# Patient Record
Sex: MALE | Race: WHITE | NOT HISPANIC OR LATINO | URBAN - METROPOLITAN AREA
[De-identification: names, ages, dates, MRNs, and addresses within clinical notes are randomized per-mention and may not be internally consistent; named-entity substitution may affect disease eponyms.]

---

## 2017-02-05 ENCOUNTER — OFFICE VISIT - HEALTHEAST (OUTPATIENT)
Dept: FAMILY MEDICINE | Facility: CLINIC | Age: 27
End: 2017-02-05

## 2017-02-05 DIAGNOSIS — J32.9 SINUSITIS: ICD-10-CM

## 2017-02-05 DIAGNOSIS — H61.20 CERUMEN IMPACTION: ICD-10-CM

## 2017-02-13 ENCOUNTER — COMMUNICATION - HEALTHEAST (OUTPATIENT)
Dept: FAMILY MEDICINE | Facility: CLINIC | Age: 27
End: 2017-02-13

## 2017-02-14 ENCOUNTER — AMBULATORY - HEALTHEAST (OUTPATIENT)
Dept: FAMILY MEDICINE | Facility: CLINIC | Age: 27
End: 2017-02-14

## 2017-02-14 DIAGNOSIS — J32.9 SINUSITIS: ICD-10-CM

## 2017-05-09 ENCOUNTER — OFFICE VISIT - HEALTHEAST (OUTPATIENT)
Dept: FAMILY MEDICINE | Facility: CLINIC | Age: 27
End: 2017-05-09

## 2017-05-09 DIAGNOSIS — J32.9 SINUS INFECTION: ICD-10-CM

## 2017-05-12 ENCOUNTER — OFFICE VISIT - HEALTHEAST (OUTPATIENT)
Dept: FAMILY MEDICINE | Facility: CLINIC | Age: 27
End: 2017-05-12

## 2017-05-12 ENCOUNTER — COMMUNICATION - HEALTHEAST (OUTPATIENT)
Dept: SCHEDULING | Facility: CLINIC | Age: 27
End: 2017-05-12

## 2017-05-12 DIAGNOSIS — J32.9 RECURRENT SINUS INFECTIONS: ICD-10-CM

## 2017-05-14 ENCOUNTER — COMMUNICATION - HEALTHEAST (OUTPATIENT)
Dept: SCHEDULING | Facility: CLINIC | Age: 27
End: 2017-05-14

## 2017-05-15 ENCOUNTER — OFFICE VISIT - HEALTHEAST (OUTPATIENT)
Dept: FAMILY MEDICINE | Facility: CLINIC | Age: 27
End: 2017-05-15

## 2017-05-15 DIAGNOSIS — J30.9 ALLERGIC RHINITIS: ICD-10-CM

## 2017-05-26 ENCOUNTER — OFFICE VISIT - HEALTHEAST (OUTPATIENT)
Dept: ALLERGY | Facility: CLINIC | Age: 27
End: 2017-05-26

## 2017-05-26 DIAGNOSIS — J30.89 ALLERGIC RHINITIS DUE TO OTHER ALLERGEN: ICD-10-CM

## 2017-05-26 ASSESSMENT — MIFFLIN-ST. JEOR: SCORE: 1958.24

## 2017-08-08 ENCOUNTER — COMMUNICATION - HEALTHEAST (OUTPATIENT)
Dept: FAMILY MEDICINE | Facility: CLINIC | Age: 27
End: 2017-08-08

## 2017-08-08 DIAGNOSIS — J30.89 ALLERGIC RHINITIS DUE TO OTHER ALLERGEN: ICD-10-CM

## 2017-10-10 ENCOUNTER — OFFICE VISIT - HEALTHEAST (OUTPATIENT)
Dept: FAMILY MEDICINE | Facility: CLINIC | Age: 27
End: 2017-10-10

## 2017-10-10 DIAGNOSIS — L72.3 SEBACEOUS CYST: ICD-10-CM

## 2018-06-06 ENCOUNTER — COMMUNICATION - HEALTHEAST (OUTPATIENT)
Dept: FAMILY MEDICINE | Facility: CLINIC | Age: 28
End: 2018-06-06

## 2018-06-06 ENCOUNTER — COMMUNICATION - HEALTHEAST (OUTPATIENT)
Dept: SCHEDULING | Facility: CLINIC | Age: 28
End: 2018-06-06

## 2018-06-06 ENCOUNTER — RECORDS - HEALTHEAST (OUTPATIENT)
Dept: GENERAL RADIOLOGY | Facility: CLINIC | Age: 28
End: 2018-06-06

## 2018-06-06 ENCOUNTER — OFFICE VISIT - HEALTHEAST (OUTPATIENT)
Dept: FAMILY MEDICINE | Facility: CLINIC | Age: 28
End: 2018-06-06

## 2018-06-06 DIAGNOSIS — R52 PAIN: ICD-10-CM

## 2018-06-06 DIAGNOSIS — R52 PAIN, UNSPECIFIED: ICD-10-CM

## 2018-06-06 DIAGNOSIS — J30.9 ALLERGIC RHINITIS: ICD-10-CM

## 2018-06-06 RX ORDER — IBUPROFEN 200 MG
200 TABLET ORAL
Status: SHIPPED | COMMUNITY
Start: 2012-08-02

## 2019-09-30 ENCOUNTER — RECORDS - HEALTHEAST (OUTPATIENT)
Dept: ADMINISTRATIVE | Facility: OTHER | Age: 29
End: 2019-09-30

## 2019-10-09 ENCOUNTER — RECORDS - HEALTHEAST (OUTPATIENT)
Dept: ADMINISTRATIVE | Facility: OTHER | Age: 29
End: 2019-10-09

## 2021-05-30 VITALS — HEIGHT: 70 IN | WEIGHT: 222 LBS | BODY MASS INDEX: 31.78 KG/M2

## 2021-05-30 VITALS — WEIGHT: 224.9 LBS

## 2021-05-30 VITALS — WEIGHT: 224 LBS

## 2021-05-30 VITALS — WEIGHT: 221 LBS

## 2021-05-30 VITALS — WEIGHT: 223 LBS

## 2021-05-31 VITALS — BODY MASS INDEX: 31.57 KG/M2 | WEIGHT: 220 LBS

## 2021-06-01 VITALS — WEIGHT: 209 LBS | BODY MASS INDEX: 29.99 KG/M2

## 2021-06-10 NOTE — PROGRESS NOTES
ASSESSMENT:  1. Allergic rhinitis  Presumed, patient has year-round symptoms, has never had allergy testing.  - Ambulatory referral to Allergy      PLAN:  1.  The patient will continue his symptomatic treatment he is just finishing a course of Levaquin.  2.  Add prednisone 20 mg twice daily for 5 days.  3.  Referral to her allergist, explained to the patient that I would prefer to have him tested for allergies and if so have optimal treatment before I would consider ENT referral.  4.  Follow-up as needed for this and other issues.    Orders Placed This Encounter   Procedures     Ambulatory referral to Allergy     Referral Priority:   Routine     Referral Type:   Consultation     Referral Reason:   Evaluation and Treatment     Requested Specialty:   Allergy     Number of Visits Requested:   1     There are no discontinued medications.    No Follow-up on file.    CHIEF COMPLAINT:  Chief Complaint   Patient presents with     Sinusitis     recurrent illness - was seen at [facility] last week has gotten worse, pressure under eyes and forehead - given rx  and not any better      Allergies     has allergies as well, using nasal spray which seems to help a little - talk about ENT referral       SUBJECTIVE:  Stevan is a 27 y.o. male who presents to the clinic today regarding congestion.    Congestion: He has had long-standing sinus issues, which have worsened over the past several years. He has worse sinus symptoms in the spring or in the winter when others are sick with a cold, which he seems to be susceptible to catching as well. His cold will last for weeks. Over the past 3 weeks, he has been experiencing nasal symptoms. He has been seen twice over the past week with congestion, and given Levaquin. He started taking Levaquin on 5/9/2017 and takes his last pill today. He has found Afrin to be helpful. He has had some coughing with drainage.    Typically, he uses a Sudafed with congestion. He uses a Neti Pot and Flonase daily.  During allergy season, he uses Allegra. He has found the Neti Pot to be helpful.    REVIEW OF SYSTEMS:   He has not previously seen an allergist. He has not previously been on montelukast or prednisone. He denies any known allergies to medication. All other systems are negative.    PFSH:  Immunization History   Administered Date(s) Administered     DT (pediatric) 01/01/2001     Hep A, historic 04/09/2007, 06/06/2008     Hep B, historic 05/24/2000, 10/13/2000, 04/02/2001     Influenza, inj, historic 12/17/2007     Influenza, seasonal,quad inj 36+ mos 09/16/2016     MMR 01/01/1993, 05/24/2000     Td, historic 06/06/2008     Tdap 06/06/2008     Social History     Social History     Marital status: Single     Spouse name: N/A     Number of children: N/A     Years of education: N/A     Occupational History     Not on file.     Social History Main Topics     Smoking status: Never Smoker     Smokeless tobacco: Never Used     Alcohol use Not on file      Comment: Rare     Drug use: Not on file     Sexual activity: Not on file     Other Topics Concern     Not on file     Social History Narrative     No past medical history on file.  Family History   Problem Relation Age of Onset     Cancer Maternal Grandmother      Diabetes type II Maternal Grandfather      Alzheimer's disease Paternal Grandfather      Allergies Father        MEDICATIONS:  Current Outpatient Prescriptions   Medication Sig Dispense Refill     fexofenadine (ALLEGRA) 180 MG tablet Take 180 mg by mouth daily.       fluticasone (FLONASE) 50 mcg/actuation nasal spray 1 spray into each nostril daily.       levoFLOXacin (LEVAQUIN) 500 MG tablet Take 1 tablet (500 mg total) by mouth daily for 7 days. 7 tablet 0     predniSONE (DELTASONE) 20 MG tablet Take 1 tablet (20 mg total) by mouth 2 (two) times a day for 5 days. 10 tablet 0     No current facility-administered medications for this visit.        TOBACCO USE:  History   Smoking Status     Never Smoker   Smokeless  Tobacco     Never Used       VITALS:  Vitals:    05/15/17 1156   BP: 122/82   Pulse: 80   Weight: (!) 223 lb (101.2 kg)     Wt Readings from Last 3 Encounters:   05/15/17 (!) 223 lb (101.2 kg)   05/12/17 (!) 224 lb (101.6 kg)   05/09/17 (!) 224 lb 14.4 oz (102 kg)       PHYSICAL EXAM:  Constitutional:   Reveals an alert male that appears stated age.  Vitals: per nursing notes.  HEENT:  Ears:  External canals, TMs clear.    Eyes:  EOMs full, PERRL.  Nose: Mucus congestion bilaterally, worse on the left than the right.  Lungs: Clear to A&P without rales or wheezes.  Respiratory effort normal.  Neuro:  Alert and oriented. Cranial nerves, motor, sensory exams are intact.  No gross focal deficits.  Psychiatric:  Memory intact, mood appropriate.    DATA REVIEWED:  Additional History from Old Records Summarized (2): Reviewed 5/12/2017 note regarding a sinus infection. Reviewed 5/9/2017 regarding a sinus infection.  Decision to Obtain Records (1): None  Radiology Tests Summarized or Ordered (1): Reviewed 8/4/2012 CT sinus note.  Labs Reviewed or Ordered (1): None  Medicine Test Summarized or Ordered (1): None  Independent Review of EKG, X-RAY, or RAPID STREP (2 each): None    The visit lasted a total of 13 minutes face to face with the patient. Over 50% of the time was spent counseling and educating the patient about congestion.    I, Darius Mojica, am scribing for and in the presence of, Dr. Wen.    I, Dr. Wen, personally performed the services described in this documentation, as scribed by Darius Mojica in my presence, and it is both accurate and complete.    Total Data Points: 3

## 2021-06-10 NOTE — PROGRESS NOTES
SUBJECTIVE:    Stevna Aguirre is a 27 y.o. male comes in for reevaluation of a sinus infection.  He was seen several days ago and started on Levaquin.  He feels like he is not getting any better.  Pain is worse on the left side.  He has a history of recurrent sinus infections and has about 3 per year.  He uses a [place] pot daily.  He is also taking over-the-counter seasonal allergy medications.  Back in August 2012 he had a CT of the paranasal sinuses that did show sinus polyps or retention cysts in both the left and right maxillary sinuses.  There is also concerned about a periapical tooth abscess on the left side.  The patient did follow-up with a dentist and no abscess was seen.    He was recently treated with Levaquin because he said the Augmentin did not work well for him.    The patient last saw his dentist 1 or 2 months ago.    OBJECTIVE:  /80 (Patient Site: Left Arm, Patient Position: Sitting, Cuff Size: Adult Regular)  Pulse 81  Temp 98.1  F (36.7  C) (Oral)   Resp 16  Wt (!) 224 lb (101.6 kg)  SpO2 95%   HEENT: Eyes, ears, nose and throat are unremarkable.  Face: Mild swelling noted to the left nasolabial fold.  There is some tenderness when palpating just to the left of midline on his gums.  Mouth: There is some tenderness when tapping on the left lateral incisor.  Neck: supple without lymphadenopathy  SKIN: Warm and dry with good color.    ASSESSMENT/PLAN:  Left facial swelling with tenderness to the left lateral incisor and history of sinusitis and either polyps or retention cysts documented on CT on August 2012.  He currently is on Levaquin and I think he needs to stay on this.  I do think he needs to follow-up with ENT and a referral was generated.

## 2021-06-10 NOTE — PROGRESS NOTES
"ASSESSMENT/PLAN:   Symptoms and exam consistent with sinus infection and with duration of 10 days and worsening sx in 3-4 days, likely bacterial in origin.  Patient notes having \"poor luck\" with Augmentin and would like to try another agent. Discussed that this is fist line for sinus infection but he would like another agent.  Will try Levaquin 500mg QD for 7 days.  Follow up for worsening sx.  Consider follwing up with PCP and discussion of ENT referral for evaluation for nasal polyps or chronic sinus disease, with hx of 3 infections/year. Continue allergy meds.     Desiree Stevens MD    SUBJECTIVE:   Stevan Aguirre is a 27 y.o. male presents today, with concerns of a sinus infection.  He has had congestion, jaw achiness and headache for the last 12 days.  He has seasonal alllergies and is taking flonase, ibuprofen.  He has no problems with dental issues on the left side.  He is also taking allegra.  No fevers, no cough, sore throat.  No shortness of breath.   He has had sinus infections in the past.  He has ear pressure on the left side.     He notes his pain on the left side has gotten worse over the last 3-4 days.  No medication allergies.       Patient Active Problem List   Diagnosis     Allergic Rhinitis     Acute Sinusitis     Cellulitis       History   Smoking Status     Never Smoker   Smokeless Tobacco     Never Used       Current Medications:  Current Outpatient Prescriptions on File Prior to Visit   Medication Sig Dispense Refill     fexofenadine (ALLEGRA) 180 MG tablet Take 180 mg by mouth daily.       fluticasone (FLONASE) 50 mcg/actuation nasal spray 1 spray into each nostril daily.       No current facility-administered medications on file prior to visit.        Allergies:   No Known Allergies    OBJECTIVE:   Vitals:    05/09/17 1751   BP: 114/74   Patient Site: Right Arm   Patient Position: Sitting   Cuff Size: Adult Large   Pulse: 73   Resp: 16   Temp: 98.4  F (36.9  C)   TempSrc: Oral   SpO2: 97% "   Weight: (!) 224 lb 14.4 oz (102 kg)     Physical exam reveals a 27 y.o. male.   Appears healthy, alert and cooperative.  Eyes: no conjunctivitis, lids normal.   Ears:  normal TMs bilaterally  Nose:    Mucosa normal. Scant, clear rhinorrhea.swollen turbinates and mucosal edema   Head: Left side maxillary tenderness present.  No right side maxillary or frontal tenderness.  Mouth:  Mucosa pink and moist.  no pharyngitis, no exudate   Lymph: no cervical lymphadenopathy  Lungs: Chest is clear, no wheezing, rhonchi, or rales. Symmetric air entry throughout both lung fields.  Heart: regular rate and rhythm, no murmur, rub or gallop  Skin: pink, warm, dry. No lesions/rash

## 2021-06-10 NOTE — PROGRESS NOTES
"Chief complaint: Allergy testing    History of present illness: This is a pleasant 27-year-old gentleman who is here today for allergy testing.  He notes during spring and fall he will have symptoms of itchy, watery eyes, sneezing and runny nose.  He has had a lot of recurrent sinus infections.  In 2012 he has had a CT scan of the sinuses that showed a retention cyst and polyp but no chronic or acute sinusitis.  He is on Allegra daily as well as fluticasone nasal spray 2 sprays in each nostril daily.  He reports this does improve symptoms.  He has no cough, wheeze or shortness of breath.  No history of asthma.  He states the congestion is his main symptom.  He also has a lot of sinus pressure.  He has had some left-sided facial swelling when he has sinusitis.    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      Current Outpatient Prescriptions:      fexofenadine (ALLEGRA) 180 MG tablet, Take 180 mg by mouth daily., Disp: , Rfl:      fluticasone (FLONASE) 50 mcg/actuation nasal spray, 1 spray into each nostril daily., Disp: , Rfl:      montelukast (SINGULAIR) 10 mg tablet, Take 1 tablet (10 mg total) by mouth at bedtime., Disp: 30 tablet, Rfl: 1    No Known Allergies    /80  Pulse 74  Resp 16  Ht 5' 10\" (1.778 m)  Wt 222 lb (100.7 kg)  BMI 31.85 kg/m2  Gen: Pleasant male not in acute distress  HEENT: Eyes no erythema of the bulbar or palpebral conjunctiva, no edema. Ears: TMs well visualized, no effusions. Nose: No congestion, mucosa normal. Mouth: Throat clear, no lip or tongue edema.   Cardiac: Regular rate and rhythm, no murmurs, rubs or gallops  Respiratory: Clear to auscultation bilaterally, no adventitious breath sounds  Lymph: No supraclavicular or cervical " lymphadenopathy  Skin: No rashes or lesions  Psych: Alert and oriented times 3    Last Percutaneous Allergy Test Results  Trees  Antoni, White  1:20 H  (W/F in mm): 3/11 (05/26/17 1444)  Birch Mix 1:20 H (W/F in mm): 11/30 (05/26/17 1444)  Rosebud, Common 1:20 H (W/F in mm): 4/21 (05/26/17 1444)  Elm, American 1:20 H (W/F in mm): 3/f (05/26/17 1444)  Ascension, Shagbark 1:20 H (W/F in mm): 3/f (05/26/17 1444)  Maple, Hard/Sugar 1:20 H (W/F in mm): 0 (05/26/17 1444)  [place] Mix 1:20 H (W/F in mm): 4/14 (05/26/17 1444)  [place], Red 1:20 H (W/F in mm): 7/20 (05/26/17 1444)  [place], American 1:20 H (W/F in mm): 3/f (05/26/17 1444)  [place] Tree 1:20 H (W/F in mm): 3/f (05/26/17 1444)  Dust Mites  D. Pteronyssinus Mite 30,000 AU/ML H (W/F in mm): 0 (05/26/17 1444)  D. Farinae Mite 30,000 AU/ML H (W/F in mm: 0 (05/26/17 1444)  Grasses  Grass Mix #4 10,000 BAU/ML H: 3/10 (05/26/17 1444)  Jelani Grass 1:20 H (W/F in mm): 0 (05/26/17 1444)  Cockroach  Cockroach Mix 1:10 H (W/F in mm): 0 (05/26/17 1444)  Molds/Fungi  Alternaria Tenuis 1:10 H (W/F in mm): 0 (05/26/17 1444)  Aspergillus Fumigatus 1:10 H (W/F in mm): 0 (05/26/17 1444)  Homodendrum Cladosporioides 1:10 H (W/F in mm): 0 (05/26/17 1444)  Penicillin Notatum 1:10 H (W/F in mm): 0 (05/26/17 1444)  Epicoccum 1:10 H (W/F in mm): 0 (05/26/17 1444)  Weeds  Ragweed, Short 1:20 H (W/F in mm): 0 (05/26/17 1444)  Dock, Sorrel 1:20 H (W/F in mm): 0 (05/26/17 1444)  Lamb's Quarter 1:20 H (W/F in mm): 0 (05/26/17 1444)  Pigweed, Rough Red Root 1:20 H  (W/F in mm): 0 (05/26/17 1444)  Plantain, English 1:20 H  (W/F in mm): 3/11 (05/26/17 1444)  Sagebrush, Mugwort 1:20 H  (W/F in mm): 0 (05/26/17 1444)  Animal  Cat 10,000 BAU/ML H (W/F in mm): 3/f (05/26/17 1444)  Dog 1:10 H (W/F in mm): 0 (05/26/17 1444)  Controls  Device Type: QUINTIP (05/26/17 1444)  Neg. control: 50% Glycerine/Saline H (W/F in mm): 0 (05/26/17 1444)  Pos. control: Histamine 6mg/ML (W/F in mms): 5/25 (05/26/17  0541)    Impression report and plan:  1.  Allergic rhinitis  2.  Recurrent sinusitis    I recommend the addition of montelukast of Flonase and Allegra.  I cautioned him to the rare side effect of mood disturbance.  He does use nasal rinses regularly.  Continue with this.  He could consider allergy shots.  I suspect he has a combination of nonallergic and allergic rhinitis.  If he has a sinus infection outside of pollen season, I recommend a CT scan of sinuses.  Follow as needed.

## 2021-06-13 NOTE — PROGRESS NOTES
ASSESSMENT:  1. Sebaceous cyst  Mid back skin lesion consistent with sebaceous cyst.  Asymptomatic.      PLAN:  1.  Influenza vaccine.  2.  Discussed options such as incision and drainage versus watchful waiting, after discussion will simply watch and wait, this could be drained if it becomes symptomatic or it bothers him in some other way.  3.  She due for physical in 2018.      Orders Placed This Encounter   Procedures     Influenza, Seasonal,Quad Inj, 36+ MOS     Medications Discontinued During This Encounter   Medication Reason     fluticasone (FLONASE) 50 mcg/actuation nasal spray Therapy completed     montelukast (SINGULAIR) 10 mg tablet Therapy completed       No Follow-up on file.    CHIEF COMPLAINT:  Chief Complaint   Patient presents with     Mass     on the back getting bigger the past few months, not painful        SUBJECTIVE:  Stevan is a 27 y.o. male presenting to the clinic today with a mass on the back of his neck.     Mass: Starting about 9 months ago, he noticed a small bump on the back of his neck. The mass has not bothered him at all, but it has gradually increased in size over the last few months. He notes that the mass has exhibited no discharge and is not painful.     Health Maintenance: He will receive the influenza vaccine today.     REVIEW OF SYSTEMS:   All other systems are negative.    PFSH:  Immunization History   Administered Date(s) Administered     DT (pediatric) 01/01/2001     Hep A, historic 04/09/2007, 06/06/2008     Hep B, historic 05/24/2000, 10/13/2000, 04/02/2001     Influenza, inj, historic 12/17/2007     Influenza, seasonal,quad inj 36+ mos 09/16/2016, 10/10/2017     MMR 01/01/1993, 05/24/2000     Td, historic 06/06/2008     Tdap 06/06/2008     Social History     Social History     Marital status: Single     Spouse name: N/A     Number of children: N/A     Years of education: N/A     Occupational History     Not on file.     Social History Main Topics     Smoking status:  Never Smoker     Smokeless tobacco: Never Used     Alcohol use Not on file      Comment: Rare     Drug use: Not on file     Sexual activity: Not on file     Other Topics Concern     Not on file     Social History Narrative     No past medical history on file.  Family History   Problem Relation Age of Onset     Cancer Maternal Grandmother      Diabetes type II Maternal Grandfather      Alzheimer's disease Paternal Grandfather      Allergies Father        MEDICATIONS:  Current Outpatient Prescriptions   Medication Sig Dispense Refill     fexofenadine (ALLEGRA) 180 MG tablet Take 180 mg by mouth daily.       No current facility-administered medications for this visit.        TOBACCO USE:  History   Smoking Status     Never Smoker   Smokeless Tobacco     Never Used       VITALS:  Vitals:    10/10/17 0811   BP: 130/60   Pulse: 66   Weight: 220 lb (99.8 kg)     Wt Readings from Last 3 Encounters:   10/10/17 220 lb (99.8 kg)   05/26/17 222 lb (100.7 kg)   05/15/17 (!) 223 lb (101.2 kg)       PHYSICAL EXAM:  Constitutional:   Reveals an alert, pleasant, talkative male.  Vitals: per nursing notes.  Skin: Mid-Back: Visible palpable subcutaneous swelling size of a large marble about 2.5 cm, not red, not draining, non-tender.   Neuro:  Alert and oriented. Cranial nerves, motor, sensory exams are intact.  No gross focal deficits.  Psychiatric:  Memory intact, mood appropriate.      DATA REVIEWED:  Additional History from Old Records Summarized (2): None.  Decision to Obtain Records (1): None.  Radiology Tests Summarized or Ordered (1): None.  Labs Reviewed or Ordered (1): None.  Medicine Test Summarized or Ordered (1): None.  Independent Review of EKG, X-RAY, or RAPID STREP (2 each): None.    The visit lasted a total of 6 minutes face to face with the patient. Over 50% of the time was spent counseling and educating the patient about sebaceous cysts.    Washington COLEMAN, am scribing for and in the presence of, Dr. Wen.    Dr. VIRGINIA  Behzad, personally performed the services described in this documentation, as scribed by Washington Lambert in my presence, and it is both accurate and complete.    Total data points: 0

## 2021-06-18 NOTE — PROGRESS NOTES
"ASSESSMENT:  1. Left knee Injury   Acute injury left knee x-ray negative.  Either a sprain of the knee, or it is possible that there is an underlying meniscal tear.  - XR Knee Left 1 or 2 VWS; Future      PLAN:  1.  X-ray results discussed with the patient.  2.  For now watchful waiting, ice anti-inflammatories, discussed to the patient that he is not causing further injury by walking in his knee.  3.  If in the next 1-2 weeks or so however he is not having any improvement would then consider an MRI to rule out a meniscal tear or other pathology.       Orders Placed This Encounter   Procedures     XR Knee Left 1 or 2 VWS     Standing Status:   Future     Number of Occurrences:   1     Standing Expiration Date:   6/6/2019     Order Specific Question:   Can the procedure be changed per Radiologist protocol?     Answer:   Yes     There are no discontinued medications.    No Follow-up on file.    CHIEF COMPLAINT:  Chief Complaint   Patient presents with     Knee Pain     injured last pm playing volleyball, twisted left knee - painful and  slight swollen - hurts to put wt on it and trouble with ROM        SUBJECTIVE:  Stevan is a 28 y.o. male presenting to the clinic today with knee pain.    Knee Pain: He was playing sand volleyball last night when he collided with a teammate. Although the teammate hit him in the left shoulder, his left foot planted and his knee twisted in an odd direction. He believed that he heard a popping sensation and felt immediate pain. He was unable to continue playing. This morning, he continues to have pain, especially upon ambulation. He does not find the pain \"crippling\" as he is still able to walk. He has been icing and taking ibuprofen for pain management.  He denies any swelling. He was able to sleep last night after placing a pillow in between his legs. He requests a note for work stating that he was seen today.    REVIEW OF SYSTEMS:   All other systems are " negative.    PFSH:  Immunization History   Administered Date(s) Administered     DT (pediatric) 01/01/2001     Hep A, historic 04/09/2007, 06/06/2008     Hep B, historic 05/24/2000, 10/13/2000, 04/02/2001     Influenza, inj, historic,unspecified 12/17/2007     Influenza, seasonal,quad inj 36+ mos 09/16/2016, 10/10/2017     MMR 01/01/1993, 05/24/2000     Td,adult,historic,unspecified 06/06/2008     Tdap 06/06/2008     Social History     Social History     Marital status: Single     Spouse name: N/A     Number of children: N/A     Years of education: N/A     Occupational History           Social History Main Topics     Smoking status: Never Smoker     Smokeless tobacco: Never Used     Alcohol use Not on file      Comment: Rare     Drug use: Not on file     Sexual activity: Not on file     Other Topics Concern     Not on file     Social History Narrative     No past medical history on file.  Family History   Problem Relation Age of Onset     Cancer Maternal Grandmother      Diabetes type II Maternal Grandfather      Alzheimer's disease Paternal Grandfather      Allergies Father        MEDICATIONS:  Current Outpatient Prescriptions   Medication Sig Dispense Refill     fexofenadine (ALLEGRA) 180 MG tablet Take 180 mg by mouth daily.       ibuprofen (ADVIL,MOTRIN) 200 MG tablet Take 200 mg by mouth.       No current facility-administered medications for this visit.        TOBACCO USE:  History   Smoking Status     Never Smoker   Smokeless Tobacco     Never Used       VITALS:  Vitals:    06/06/18 0908   BP: 130/70   Pulse: 74   Weight: 209 lb (94.8 kg)     Wt Readings from Last 3 Encounters:   06/06/18 209 lb (94.8 kg)   10/10/17 220 lb (99.8 kg)   05/26/17 222 lb (100.7 kg)       PHYSICAL EXAM:  Constitutional:   Reveals a pleasant male that appears stated age.  Vitals: per nursing notes.  Musculoskeletal: Left knee: no obvious swelling, slight tenderness medial joint line, able to fully extend left knee.  Neuro:   Alert and oriented. Cranial nerves, motor, sensory exams are intact.  No gross focal deficits.  Psychiatric:  Memory intact, mood appropriate.    DATA REVIEWED:  Additional History from Old Records Summarized (2): None.   Decision to Obtain Records (1): None.   Radiology Tests Summarized or Ordered (1): Ordered left knee x-ray.  Labs Reviewed or Ordered (1): None.   Medicine Test Summarized or Ordered (1): None.   Independent Review of EKG, X-RAY, or RAPID STREP (2 each): Review of ordered left knee x-ray; negative for fracture.     The visit lasted a total of 10 minutes face to face with the patient. Over 50% of the time was spent counseling and educating the patient about knee injuries.    IWashington, am scribing for and in the presence of, Dr. Wen.    I, Dr. Wen, personally performed the services described in this documentation, as scribed by Washington Lmabert in my presence, and it is both accurate and complete.    Total data points: 3

## 2021-06-25 NOTE — PROGRESS NOTES
Progress Notes by Vish Jackson DO at 2/5/2017  8:00 AM     Author: Vish Jackson DO Service: -- Author Type: Physician    Filed: 2/5/2017  9:11 AM Encounter Date: 2/5/2017 Status: Signed    : Vish Jackson DO (Physician)       Chief Complaint   Patient presents with   ? Nasal Congestion     has been on 3 rounds of antibiotics, has been off meds for a week, using nasal washes and mucinex   ? Sinus Problem       Chief Complaint   Patient presents with   ? Nasal Congestion     has been on 3 rounds of antibiotics, has been off meds for a week, using nasal washes and mucinex   ? Sinus Problem        History of Present Illness: Nursing notes reviewed. Patient was initially treated through a web based service with two 7 day rounds of treatment with Augmentin with partial improvement. He repeated web based treatment, and was treated with doxycycline with partial improvement, last taking this about 2 weeks ago. The symptoms are worse again the past 5-7 days, with increased nasal congestion, and pressure in ears. He is currently taking Allegra and using Flonase nasal spray. No shortness of breath. He has a little cough.     Review of systems: See history of present illness, otherwise negative.     Current Outpatient Prescriptions   Medication Sig Dispense Refill   ? fexofenadine (ALLEGRA) 180 MG tablet Take 180 mg by mouth daily.     ? fluticasone (FLONASE) 50 mcg/actuation nasal spray 1 spray into each nostril daily.     ? azithromycin (ZITHROMAX Z-RC) 250 MG tablet Take 2 tablets (500 mg) on  Day 1,  followed by 1 tablet (250 mg) once daily on Days 2 through 5. 6 tablet 0     No current facility-administered medications for this visit.        No past medical history on file.   No past surgical history on file.   Social History     Social History   ? Marital status: Single     Spouse name: N/A   ? Number of children: N/A   ? Years of education: N/A     Social History Main Topics   ? Smoking status: Never Smoker    ? Smokeless tobacco: Never Used   ? Alcohol use Not on file      Comment: Rare   ? Drug use: Not on file   ? Sexual activity: Not on file     Other Topics Concern   ? Not on file     Social History Narrative       History   Smoking Status   ? Never Smoker   Smokeless Tobacco   ? Never Used      Exam:   Blood pressure 120/80, pulse 88, temperature 99.5  F (37.5  C), resp. rate 10, weight 221 lb (100.2 kg), SpO2 96 %.    EXAM:   General: Vital signs reviewed. Patient is in no acute appearing distress with no cough. Breathing is non labored appearing. Patient is alert and oriented x 3.  ENT: initial ear exam showed a complete cerumen impaction in right ear, which was cleared with a combination of me using an ear pick, and support staff irrigating with water. Follow up ear exam shows clear TMs with no injection, nasal turbinates are injected and edematous with tender left maxillary sinuses, no pharyngeal injection noted.  Neck: supple with no adenopathy  Heart: Normal rate and rhythm without murmur  Lungs: Clear to auscultation with good air flow bilaterally.  Skin: warm and dry  Assessment/Plan   1. Cerumen impaction  Ear wax removal    right ear   2. Sinusitis  azithromycin (ZITHROMAX Z-RC) 250 MG tablet       Patient Instructions     Also see info below. Be seen again in 4-5 days if symptoms are not better, sooner if feeling any worse.    Impacted Earwax  Impacted earwax is a buildup of the natural wax in the ear (cerumen). Impacted earwax is very common. It can cause symptoms such as hearing loss. It can also stop a doctor doing an exam of your ear.  Understanding earwax  Tiny glands in your ear make substances that combine with dead skin cells to form earwax. Earwax helps protect your ear canal from water, dirt, infection, and injury. Over time, earwax travels from the inner part of your ear canal to the entrance of the canal. Then it falls away naturally. But in some cases, it cant travel to the entrance of the  canal. This may be because of a health condition or objects put in the ear. With age, earwax tends to become harder and less fluid. Older adults are more likely to have problems with earwax buildup.  What causes impacted earwax?  Earwax can build up because of many health conditions. Some cause a physical blockage. Others cause too much earwax to be made. Health conditions that can cause earwax buildup include:    Bony blockage in the ear (osteoma or exostoses)    Infections, such as swimmers ear (external otitis)    Skin disease, such as eczema    Autoimmune diseases, such as lupus    A narrowed ear canal from birth, chronic inflammation, or injury    Too much earwax because of injury    Too much earwax because of  water in the ear canal  Objects repeatedly placed in the ear can also cause impacted earwax. For example, putting cotton swabs in the ear may push the wax deeper into the ear. Over time, this may cause blockage. Hearing aids, swimming plugs, and swim molds can cause the same problem when used again and again.  In some cases, the cause of impacted earwax is not known.  Symptoms of impacted earwax  Excess earwax usually does not cause any symptoms, unless there is a large amount of buildup. Then it may cause symptoms such as:    Hearing loss    Earache    Sense of ear fullness    Itching in the ear    Dizziness    Ringing in the ears    Cough  Treatment for impacted earwax  If you dont have symptoms, you may not need treatment. Often the earwax goes away on its own with time. If you have symptoms, you may have 1 or more treatments such as:    Ear drops. These help to soften the earwax. This helps it leave the ear over time.    Rinsing (irrigation) of the ear canal with water. This is done in a doctors office.    Removal of the earwax with small tools. This is also done in a doctors office.  In rare cases, some treatments for earwax removal may cause complications such as:    Swimmers ear (otitis  external)    Earache    Short-term hearing loss    Dizziness    Water trapped in the ear canal    Hole in the eardrum    Ringing in the ears    Bleeding from the ear  Talk with your health care provider about which risks apply most to you.  Dont use these at home  Health care providers do not advise use of ear candles or ear vacuum kits. These methods are not shown to work.   Preventing impacted earwax  You may not be able to prevent impacted earwax if you have a health condition that causes it, such as eczema. In other cases, you may be able to prevent earwax buildup by:    Using ear drops once a week    Having routine cleaning of the ear about every 6 months    Not using cotton swabs in the ear  When to call the health care provider  Call your health care provider right away if you have severe symptoms after earwax removal. These may include bleeding or severe ear pain.        8430-3976 [employer]. [address] [place] [address]. All rights reserved. This information is not intended as a substitute for professional medical care. Always follow your healthcare professional's instructions.          Causes of Sinusitis           Mucus helps keep your sinuses clean. But mucus may build up in the sinuses due to colds, allergies, or obstructions. These things interfere with the natural drainage of mucus. This may lead to sinusitis (sinus inflammation and infection).    Acute sinusitis comes on suddenly. It often happens right after an upper respiratory infection, such as a cold.    Chronic sinusitis is ongoing swelling of the sinus lining. This is often the result of allergies or chronic infections.  Colds and other infections  A cold or flu may cause your sinus and nasal linings to swell. Sinus openings can become blocked. This causes mucus to back up. This backed-up mucus becomes an ideal place for bacteria to grow. Thick, yellow, or discolored mucus is one sign of infection.  Allergic  reactions  You may be sensitive to certain substances. This causes the release of histamine in the body. Histamine makes your sinus and nasal linings swell. Long-term swelling clogs your sinuses. It prevents the cilia (tiny hairs in the nasal lining) from sweeping away mucus. Allergy symptoms can be persistent. But theyre less severe than with colds.  Obstructions    A polyp is a sac of swollen tissue. It can be the result of an allergy or infection. It may block the middle meatus (the opening where most of your sinuses drain). It may even grow large enough to block your nose.    A deviated septum is when the thin wall inside your nose is pushed to one side. It is often the result of injury. This can block your middle meatus.    [id number] [employer]. [address] [place] [address], [place] [address]. All rights reserved. This information is not intended as a substitute for professional medical care. Always follow your healthcare professional's instructions.           Vish Jackson,

## 2021-06-27 ENCOUNTER — HEALTH MAINTENANCE LETTER (OUTPATIENT)
Age: 31
End: 2021-06-27

## 2021-10-17 ENCOUNTER — HEALTH MAINTENANCE LETTER (OUTPATIENT)
Age: 31
End: 2021-10-17

## 2022-07-23 ENCOUNTER — HEALTH MAINTENANCE LETTER (OUTPATIENT)
Age: 32
End: 2022-07-23

## 2022-10-01 ENCOUNTER — HEALTH MAINTENANCE LETTER (OUTPATIENT)
Age: 32
End: 2022-10-01

## 2023-08-06 ENCOUNTER — HEALTH MAINTENANCE LETTER (OUTPATIENT)
Age: 33
End: 2023-08-06